# Patient Record
Sex: MALE | URBAN - METROPOLITAN AREA
[De-identification: names, ages, dates, MRNs, and addresses within clinical notes are randomized per-mention and may not be internally consistent; named-entity substitution may affect disease eponyms.]

---

## 2019-08-21 ENCOUNTER — INPATIENT (INPATIENT)
Facility: HOSPITAL | Age: 84
LOS: 0 days | Discharge: HOME | End: 2019-08-21
Attending: HOSPITALIST | Admitting: HOSPITALIST
Payer: MEDICARE

## 2019-08-21 ENCOUNTER — TRANSCRIPTION ENCOUNTER (OUTPATIENT)
Age: 84
End: 2019-08-21

## 2019-08-21 VITALS
OXYGEN SATURATION: 98 % | RESPIRATION RATE: 16 BRPM | HEART RATE: 96 BPM | SYSTOLIC BLOOD PRESSURE: 147 MMHG | DIASTOLIC BLOOD PRESSURE: 63 MMHG | TEMPERATURE: 98 F

## 2019-08-21 VITALS
HEART RATE: 78 BPM | OXYGEN SATURATION: 96 % | DIASTOLIC BLOOD PRESSURE: 54 MMHG | TEMPERATURE: 98 F | RESPIRATION RATE: 16 BRPM | SYSTOLIC BLOOD PRESSURE: 124 MMHG

## 2019-08-21 DIAGNOSIS — K52.9 NONINFECTIVE GASTROENTERITIS AND COLITIS, UNSPECIFIED: ICD-10-CM

## 2019-08-21 DIAGNOSIS — R79.89 OTHER SPECIFIED ABNORMAL FINDINGS OF BLOOD CHEMISTRY: ICD-10-CM

## 2019-08-21 DIAGNOSIS — I10 ESSENTIAL (PRIMARY) HYPERTENSION: ICD-10-CM

## 2019-08-21 DIAGNOSIS — Z29.9 ENCOUNTER FOR PROPHYLACTIC MEASURES, UNSPECIFIED: ICD-10-CM

## 2019-08-21 LAB
ALBUMIN SERPL ELPH-MCNC: 4.1 G/DL — SIGNIFICANT CHANGE UP (ref 3.3–5)
ALP SERPL-CCNC: 76 U/L — SIGNIFICANT CHANGE UP (ref 40–120)
ALT FLD-CCNC: 13 U/L — SIGNIFICANT CHANGE UP (ref 4–41)
ANION GAP SERPL CALC-SCNC: 16 MMO/L — HIGH (ref 7–14)
APPEARANCE UR: CLEAR — SIGNIFICANT CHANGE UP
AST SERPL-CCNC: 18 U/L — SIGNIFICANT CHANGE UP (ref 4–40)
BASE EXCESS BLDV CALC-SCNC: 0.6 MMOL/L — SIGNIFICANT CHANGE UP
BASE EXCESS BLDV CALC-SCNC: 0.8 MMOL/L — SIGNIFICANT CHANGE UP
BASOPHILS # BLD AUTO: 0.03 K/UL — SIGNIFICANT CHANGE UP (ref 0–0.2)
BASOPHILS NFR BLD AUTO: 0.2 % — SIGNIFICANT CHANGE UP (ref 0–2)
BILIRUB SERPL-MCNC: 0.2 MG/DL — SIGNIFICANT CHANGE UP (ref 0.2–1.2)
BILIRUB UR-MCNC: NEGATIVE — SIGNIFICANT CHANGE UP
BLOOD GAS VENOUS - CREATININE: 1.02 MG/DL — SIGNIFICANT CHANGE UP (ref 0.5–1.3)
BLOOD GAS VENOUS - CREATININE: < 0.36 MG/DL — LOW (ref 0.5–1.3)
BLOOD GAS VENOUS - FIO2: 21 — SIGNIFICANT CHANGE UP
BLOOD UR QL VISUAL: NEGATIVE — SIGNIFICANT CHANGE UP
BUN SERPL-MCNC: 21 MG/DL — SIGNIFICANT CHANGE UP (ref 7–23)
CALCIUM SERPL-MCNC: 9.5 MG/DL — SIGNIFICANT CHANGE UP (ref 8.4–10.5)
CHLORIDE BLDV-SCNC: 108 MMOL/L — SIGNIFICANT CHANGE UP (ref 96–108)
CHLORIDE BLDV-SCNC: 114 MMOL/L — HIGH (ref 96–108)
CHLORIDE SERPL-SCNC: 102 MMOL/L — SIGNIFICANT CHANGE UP (ref 98–107)
CO2 SERPL-SCNC: 24 MMOL/L — SIGNIFICANT CHANGE UP (ref 22–31)
COLOR SPEC: YELLOW — SIGNIFICANT CHANGE UP
CREAT SERPL-MCNC: 0.91 MG/DL — SIGNIFICANT CHANGE UP (ref 0.5–1.3)
EOSINOPHIL # BLD AUTO: 0.13 K/UL — SIGNIFICANT CHANGE UP (ref 0–0.5)
EOSINOPHIL NFR BLD AUTO: 1 % — SIGNIFICANT CHANGE UP (ref 0–6)
GAS PNL BLDV: 138 MMOL/L — SIGNIFICANT CHANGE UP (ref 136–146)
GAS PNL BLDV: 140 MMOL/L — SIGNIFICANT CHANGE UP (ref 136–146)
GLUCOSE BLDV-MCNC: 193 MG/DL — HIGH (ref 70–99)
GLUCOSE BLDV-MCNC: 234 MG/DL — HIGH (ref 70–99)
GLUCOSE SERPL-MCNC: 234 MG/DL — HIGH (ref 70–99)
GLUCOSE UR-MCNC: 50 — SIGNIFICANT CHANGE UP
HCO3 BLDV-SCNC: 22 MMOL/L — SIGNIFICANT CHANGE UP (ref 20–27)
HCO3 BLDV-SCNC: 24 MMOL/L — SIGNIFICANT CHANGE UP (ref 20–27)
HCT VFR BLD CALC: 38.8 % — LOW (ref 39–50)
HCT VFR BLDV CALC: 36.9 % — LOW (ref 39–51)
HCT VFR BLDV CALC: 38.9 % — LOW (ref 39–51)
HGB BLD-MCNC: 12 G/DL — LOW (ref 13–17)
HGB BLDV-MCNC: 12 G/DL — LOW (ref 13–17)
HGB BLDV-MCNC: 12.7 G/DL — LOW (ref 13–17)
IMM GRANULOCYTES NFR BLD AUTO: 0.3 % — SIGNIFICANT CHANGE UP (ref 0–1.5)
KETONES UR-MCNC: NEGATIVE — SIGNIFICANT CHANGE UP
LACTATE BLDV-MCNC: 3.6 MMOL/L — HIGH (ref 0.5–2)
LACTATE BLDV-MCNC: 4.1 MMOL/L — CRITICAL HIGH (ref 0.5–2)
LACTATE BLDV-MCNC: 4.8 MMOL/L — CRITICAL HIGH (ref 0.5–2)
LEUKOCYTE ESTERASE UR-ACNC: NEGATIVE — SIGNIFICANT CHANGE UP
LYMPHOCYTES # BLD AUTO: 17 % — SIGNIFICANT CHANGE UP (ref 13–44)
LYMPHOCYTES # BLD AUTO: 2.19 K/UL — SIGNIFICANT CHANGE UP (ref 1–3.3)
MCHC RBC-ENTMCNC: 24.5 PG — LOW (ref 27–34)
MCHC RBC-ENTMCNC: 30.9 % — LOW (ref 32–36)
MCV RBC AUTO: 79.2 FL — LOW (ref 80–100)
MONOCYTES # BLD AUTO: 0.33 K/UL — SIGNIFICANT CHANGE UP (ref 0–0.9)
MONOCYTES NFR BLD AUTO: 2.6 % — SIGNIFICANT CHANGE UP (ref 2–14)
NEUTROPHILS # BLD AUTO: 10.13 K/UL — HIGH (ref 1.8–7.4)
NEUTROPHILS NFR BLD AUTO: 78.9 % — HIGH (ref 43–77)
NITRITE UR-MCNC: NEGATIVE — SIGNIFICANT CHANGE UP
NRBC # FLD: 0 K/UL — SIGNIFICANT CHANGE UP (ref 0–0)
PCO2 BLDV: 47 MMHG — SIGNIFICANT CHANGE UP (ref 41–51)
PCO2 BLDV: 58 MMHG — HIGH (ref 41–51)
PH BLDV: 7.28 PH — LOW (ref 7.32–7.43)
PH BLDV: 7.36 PH — SIGNIFICANT CHANGE UP (ref 7.32–7.43)
PH UR: 7 — SIGNIFICANT CHANGE UP (ref 5–8)
PLATELET # BLD AUTO: 227 K/UL — SIGNIFICANT CHANGE UP (ref 150–400)
PMV BLD: 10.8 FL — SIGNIFICANT CHANGE UP (ref 7–13)
PO2 BLDV: 22 MMHG — LOW (ref 35–40)
PO2 BLDV: 34 MMHG — LOW (ref 35–40)
POTASSIUM BLDV-SCNC: 3.9 MMOL/L — SIGNIFICANT CHANGE UP (ref 3.4–4.5)
POTASSIUM BLDV-SCNC: 4.1 MMOL/L — SIGNIFICANT CHANGE UP (ref 3.4–4.5)
POTASSIUM SERPL-MCNC: 4.2 MMOL/L — SIGNIFICANT CHANGE UP (ref 3.5–5.3)
POTASSIUM SERPL-SCNC: 4.2 MMOL/L — SIGNIFICANT CHANGE UP (ref 3.5–5.3)
PROT SERPL-MCNC: 8.1 G/DL — SIGNIFICANT CHANGE UP (ref 6–8.3)
PROT UR-MCNC: NEGATIVE — SIGNIFICANT CHANGE UP
RBC # BLD: 4.9 M/UL — SIGNIFICANT CHANGE UP (ref 4.2–5.8)
RBC # FLD: 15.2 % — HIGH (ref 10.3–14.5)
SAO2 % BLDV: 26 % — LOW (ref 60–85)
SAO2 % BLDV: 59.3 % — LOW (ref 60–85)
SODIUM SERPL-SCNC: 142 MMOL/L — SIGNIFICANT CHANGE UP (ref 135–145)
SP GR SPEC: 1.03 — SIGNIFICANT CHANGE UP (ref 1–1.04)
UROBILINOGEN FLD QL: NORMAL — SIGNIFICANT CHANGE UP
WBC # BLD: 12.85 K/UL — HIGH (ref 3.8–10.5)
WBC # FLD AUTO: 12.85 K/UL — HIGH (ref 3.8–10.5)

## 2019-08-21 PROCEDURE — 74177 CT ABD & PELVIS W/CONTRAST: CPT | Mod: 26

## 2019-08-21 PROCEDURE — 99223 1ST HOSP IP/OBS HIGH 75: CPT | Mod: GC

## 2019-08-21 RX ORDER — AMLODIPINE BESYLATE 2.5 MG/1
10 TABLET ORAL DAILY
Refills: 0 | Status: DISCONTINUED | OUTPATIENT
Start: 2019-08-21 | End: 2019-08-21

## 2019-08-21 RX ORDER — METOPROLOL TARTRATE 50 MG
1 TABLET ORAL
Qty: 0 | Refills: 0 | DISCHARGE

## 2019-08-21 RX ORDER — AMLODIPINE BESYLATE 2.5 MG/1
1 TABLET ORAL
Qty: 0 | Refills: 0 | DISCHARGE

## 2019-08-21 RX ORDER — ASPIRIN/CALCIUM CARB/MAGNESIUM 324 MG
1 TABLET ORAL
Qty: 0 | Refills: 0 | DISCHARGE

## 2019-08-21 RX ORDER — SODIUM CHLORIDE 9 MG/ML
1000 INJECTION INTRAMUSCULAR; INTRAVENOUS; SUBCUTANEOUS ONCE
Refills: 0 | Status: COMPLETED | OUTPATIENT
Start: 2019-08-21 | End: 2019-08-21

## 2019-08-21 RX ORDER — ACETAMINOPHEN 500 MG
650 TABLET ORAL EVERY 6 HOURS
Refills: 0 | Status: DISCONTINUED | OUTPATIENT
Start: 2019-08-21 | End: 2019-08-21

## 2019-08-21 RX ORDER — ACETAMINOPHEN 500 MG
650 TABLET ORAL ONCE
Refills: 0 | Status: COMPLETED | OUTPATIENT
Start: 2019-08-21 | End: 2019-08-21

## 2019-08-21 RX ORDER — SODIUM CHLORIDE 9 MG/ML
1000 INJECTION INTRAMUSCULAR; INTRAVENOUS; SUBCUTANEOUS
Refills: 0 | Status: DISCONTINUED | OUTPATIENT
Start: 2019-08-21 | End: 2019-08-21

## 2019-08-21 RX ORDER — ENOXAPARIN SODIUM 100 MG/ML
40 INJECTION SUBCUTANEOUS DAILY
Refills: 0 | Status: DISCONTINUED | OUTPATIENT
Start: 2019-08-21 | End: 2019-08-21

## 2019-08-21 RX ORDER — ONDANSETRON 8 MG/1
4 TABLET, FILM COATED ORAL ONCE
Refills: 0 | Status: COMPLETED | OUTPATIENT
Start: 2019-08-21 | End: 2019-08-21

## 2019-08-21 RX ORDER — ASPIRIN/CALCIUM CARB/MAGNESIUM 324 MG
81 TABLET ORAL DAILY
Refills: 0 | Status: DISCONTINUED | OUTPATIENT
Start: 2019-08-21 | End: 2019-08-21

## 2019-08-21 RX ADMIN — Medication 650 MILLIGRAM(S): at 07:18

## 2019-08-21 RX ADMIN — SODIUM CHLORIDE 1000 MILLILITER(S): 9 INJECTION INTRAMUSCULAR; INTRAVENOUS; SUBCUTANEOUS at 11:41

## 2019-08-21 RX ADMIN — SODIUM CHLORIDE 1000 MILLILITER(S): 9 INJECTION INTRAMUSCULAR; INTRAVENOUS; SUBCUTANEOUS at 07:18

## 2019-08-21 RX ADMIN — ONDANSETRON 4 MILLIGRAM(S): 8 TABLET, FILM COATED ORAL at 07:18

## 2019-08-21 RX ADMIN — SODIUM CHLORIDE 75 MILLILITER(S): 9 INJECTION INTRAMUSCULAR; INTRAVENOUS; SUBCUTANEOUS at 12:50

## 2019-08-21 NOTE — H&P ADULT - ASSESSMENT
89y man with HTN and recent travel hx to [facility] presents with diarrhea, n/v and generalized abdominal pain, admit for proctocolitis likely viral etiology.

## 2019-08-21 NOTE — H&P ADULT - PROBLEM SELECTOR PLAN 4
DVT prophylaxis: SCD  Diet: Clear liquid diet, advance as tolerated DVT prophylaxis: SCD  Diet: Clear liquid diet, advance as tolerated  GOC: ongoing, family has not decided on code status DVT prophylaxis: Lovenox   Diet: Clear liquid diet, advance as tolerated  GOC: ongoing, family has not decided on code status

## 2019-08-21 NOTE — H&P ADULT - NSHPLABSRESULTS_GEN_ALL_CORE
CBC Full  -  ( 21 Aug 2019 07:11 )  WBC Count : 12.85 K/uL  Hemoglobin : 12.0 g/dL  Hematocrit : 38.8 %  Platelet Count - Automated : 227 K/uL  Mean Cell Volume : 79.2 fL  Mean Cell Hemoglobin : 24.5 pg  Mean Cell Hemoglobin Concentration : 30.9 %  Auto Neutrophil # : 10.13 K/uL  Auto Lymphocyte # : 2.19 K/uL  Auto Monocyte # : 0.33 K/uL  Auto Eosinophil # : 0.13 K/uL  Auto Basophil # : 0.03 K/uL  Auto Neutrophil % : 78.9 %  Auto Lymphocyte % : 17.0 %  Auto Monocyte % : 2.6 %  Auto Eosinophil % : 1.0 %  Auto Basophil % : 0.2 %    08-21    142  |  102  |  21  ----------------------------<  234<H>  4.2   |  24  |  0.91    Ca    9.5      21 Aug 2019 07:11    TPro  8.1  /  Alb  4.1  /  TBili  0.2  /  DBili  x   /  AST  18  /  ALT  13  /  AlkPhos  76  08-21    LIVER FUNCTIONS - ( 21 Aug 2019 07:11 )  Alb: 4.1 g/dL / Pro: 8.1 g/dL / ALK PHOS: 76 u/L / ALT: 13 u/L / AST: 18 u/L / GGT: x         4.1  22    < from: CT Abdomen and Pelvis w/ IV Cont (08.21.19 @ 09:08) >    LOWER CHEST: Bilateral lower lobe subsegmental atelectasis.    LIVER: Within normal limits.  BILE DUCTS: Normal caliber.  GALLBLADDER: Within normal limits.  SPLEEN: Within normal limits.  PANCREAS: Within normal limits.  ADRENALS: Within normal limits.  KIDNEYS/URETERS: Bilateral renal cysts.     BLADDER: Within normal limits.  REPRODUCTIVE ORGANS: Prostate within normal limits.    BOWEL: No bowel obstruction. There is rectal wall thickening extending to   the level of the descending colon. Appendix is normal  PERITONEUM: No ascites.  VESSELS: Calcific atherosclerotic changes.  RETROPERITONEUM/LYMPH NODES: No lymphadenopathy.    ABDOMINAL WALL: Fat-containing umbilical hernia.  BONES: Degenerative changes of the spine.    IMPRESSION:     Proctocolitis.    < end of copied text >

## 2019-08-21 NOTE — H&P ADULT - NSHPSOCIALHISTORY_GEN_ALL_CORE
previously worked as a  in [employer] [facility]   lives with family in [place], but goes back and forth between [employer] [facility] [employer] and here  Denies smoking hx, illicit drug use   social EtOH drinker - drank a "small amount of Dequan Balderas" this past weekend

## 2019-08-21 NOTE — H&P ADULT - NSHPPHYSICALEXAM_GEN_ALL_CORE
PHYSICAL EXAM:    Vital Signs Last 24 Hrs  T(C): 36.2 (21 Aug 2019 10:51), Max: 36.5 (21 Aug 2019 06:07)  T(F): 97.1 (21 Aug 2019 10:51), Max: 97.7 (21 Aug 2019 06:07)  HR: 101 (21 Aug 2019 10:51) (78 - 101)  BP: 145/77 (21 Aug 2019 10:51) (124/54 - 145/77)  BP(mean): --  RR: 18 (21 Aug 2019 10:51) (16 - 18)  SpO2: 100% (21 Aug 2019 10:51) (96% - 100%)    General: No acute distress.  HEENT: NCAT.  PERRL.  EOMI.  No scleral icterus or injection.  Moist MM.  No oropharyngeal exudates.    Neck: Supple.  Full ROM.  No JVD.  No thyromegaly. No lymphadenopathy.   Heart: RRR.  Normal S1 and S2.  No murmurs, rubs, or gallops.   Lungs: CTAB. No wheezes, crackles, or rhonchi.    Abdomen: BS+, soft, NT/ND.  No organomegaly.  Skin: Warm and dry.  No rashes.  Extremities: No edema, clubbing, or cyanosis.  2+ peripheral pulses b/l.  Musculoskeletal: No deformities.  No spinal or paraspinal tenderness.  Neuro: A&Ox3.  CN II-XII intact.  5/5 strength in UE and LE b/l.  Tactile sensation intact in UE and LE b/l.  Cerebellar function intact PHYSICAL EXAM:    Vital Signs Last 24 Hrs  T(C): 36.2 (21 Aug 2019 10:51), Max: 36.5 (21 Aug 2019 06:07)  T(F): 97.1 (21 Aug 2019 10:51), Max: 97.7 (21 Aug 2019 06:07)  HR: 101 (21 Aug 2019 10:51) (78 - 101)  BP: 145/77 (21 Aug 2019 10:51) (124/54 - 145/77)  BP(mean): --  RR: 18 (21 Aug 2019 10:51) (16 - 18)  SpO2: 100% (21 Aug 2019 10:51) (96% - 100%)    General: NAD, elderly man sitting up in bed   HEENT: NCAT.  PERRL.  + dry mucosa membrane    Neck: Supple.  Full ROM.  No JVD.  Heart: RRR.  Normal S1 and S2.  No murmurs, rubs, or gallops.   Lungs: CTAB. No wheezes, crackles, or rhonchi.    Abdomen: soft, NT/ND, hyperactive bowel sound   Skin: Warm and dry.  No rashes.  Extremities: No edema, clubbing, or cyanosis.  2+ peripheral pulses b/l.  Musculoskeletal: No deformities.  No spinal or paraspinal tenderness.  Neuro: A&Ox 1-2, exam was limited due to lack of participation. per family, can walk without assistance, no issue with strength PHYSICAL EXAM:    Vital Signs Last 24 Hrs  T(C): 36.2 (21 Aug 2019 10:51), Max: 36.5 (21 Aug 2019 06:07)  T(F): 97.1 (21 Aug 2019 10:51), Max: 97.7 (21 Aug 2019 06:07)  HR: 101 (21 Aug 2019 10:51) (78 - 101)  BP: 145/77 (21 Aug 2019 10:51) (124/54 - 145/77)  BP(mean): --  RR: 18 (21 Aug 2019 10:51) (16 - 18)  SpO2: 100% (21 Aug 2019 10:51) (96% - 100%)    General: NAD, elderly man sitting up in bed   HEENT: NCAT.  PERRL.  + dry mucosa membrane    Neck: Supple.  Full ROM.  No JVD.  Heart: RRR.  Normal S1 and S2. + systolic murmur  Lungs: CTAB. No wheezes, crackles, or rhonchi.    Abdomen: soft, NT/ND, hyperactive bowel sound   Skin: Warm and dry.  No rashes.  Extremities: No edema, clubbing, or cyanosis.  2+ peripheral pulses b/l.  Musculoskeletal: No deformities.  No spinal or paraspinal tenderness.  Neuro: A&Ox 1-2, exam was limited due to lack of participation. per family, can walk without assistance, no issue with strength

## 2019-08-21 NOTE — DISCHARGE NOTE PROVIDER - HOSPITAL COURSE
H and P:     89y man with HTN and recent travel hx to [facility] presents with diarrhea, n/v and generalized abdominal pain. Abd pain started since 3AM, he had 3 foul smelling loose BMs with specks of blood, also had 2 episodes of NBNB vomiting.  Pt has family in [facility] and in the [facility], and travels back and forth. He returned from [facility] 2 weeks ago, last time in [facility] was 2 years ago. Denies fever, chills, CP, SOB, palpitation, headache. No sick contact at home. Denies prior hx of seizure, CAD, DM2.         Hospital course: pt had a brief stay in the hospital for proctocolitis likely 2/2 viral etiology. Pt is afebrile, hemodynamically stable, did not meet sepsis criteria, and was monitored off abx. Lab was significant for elevated lactate on admission (4.8 ->4.1 ->3.5). Pt received 2L of IVF in the ED, and endorsed improvement in nausea and abdominal pain. Pt could ambulate without assistance, and could tolerate PO diet. He will be discharged to home with outpt followup to PCP. H and P:     89y man with HTN and recent travel hx to [facility] presents with diarrhea, n/v and generalized abdominal pain. Abd pain started since 3AM, he had 3 foul smelling loose BMs with specks of blood, also had 2 episodes of NBNB vomiting.  Pt has family in [facility] and in the [facility], and travels back and forth. He returned from [facility] 2 weeks ago, last time in [facility] was 2 years ago. Denies fever, chills, CP, SOB, palpitation, headache. No sick contact at home. Denies prior hx of seizure, CAD, DM2.         Hospital course: pt had a brief stay in the hospital for proctocolitis likely 2/2 viral etiology. Pt is afebrile, hemodynamically stable, did not meet sepsis criteria, and was monitored off abx. Lab was significant for elevated lactate on admission (4.8 ->4.1 ->3.5). CT A/P was negative for bowel obstruction and showed proctocolitis. Pt received 2L of IVF in the ED, and endorsed improvement in nausea and abdominal pain. Pt could ambulate without assistance, and could tolerate PO diet. He will be discharged to home with outpt followup to PCP.

## 2019-08-21 NOTE — H&P ADULT - NSHPREVIEWOFSYSTEMS_GEN_ALL_CORE
REVIEW OF SYSTEMS:    CONSTITUTIONAL: No weakness, fevers or chills  EYES/ENT: No visual changes;  No vertigo or throat pain   NECK: No pain or stiffness  ENDOCRINE: no cold/hold intolerance, no polydipsia, no polyuria   HEMATOLOGY: no bruising, no bleeding, no lymphadenopathy   RESPIRATORY: No cough, wheezing, hemoptysis; No shortness of breath  CARDIOVASCULAR: No chest pain or palpitations  GASTROINTESTINAL: No abdominal pain; nausea, vomiting;  diarrhea or constipation. No hemetemesis, melena or hematochezia.  GENITOURITARY: No dysuria, frequency or hematuria  NEUROLOGICAL: No numbness or weakness  MUSCULOSKELETAL: no back pain, no spine deformity, no joint pain or deformity, no muscle ache   SKIN: No itching, burning, rashes, or lesions REVIEW OF SYSTEMS:    CONSTITUTIONAL: No weakness, fevers or chills  EYES/ENT: No visual changes;  No vertigo or throat pain   NECK: No pain or stiffness  ENDOCRINE: no cold/hold intolerance, no polydipsia, no polyuria   HEMATOLOGY: no bruising, no bleeding, no lymphadenopathy   RESPIRATORY: No cough, wheezing, hemoptysis; No shortness of breath  CARDIOVASCULAR: No chest pain or palpitations  GASTROINTESTINAL: + abdominal pain; + nausea, vomiting;  + loose stool. No hemetemesis, melena or hematochezia.  GENITOURITARY: No dysuria, frequency or hematuria  NEUROLOGICAL: No numbness or weakness  MUSCULOSKELETAL: no back pain, no spine deformity, no joint pain or deformity, no muscle ache   SKIN: No itching, burning, rashes, or lesions

## 2019-08-21 NOTE — ED PROVIDER NOTE - OBJECTIVE STATEMENT
89 y/o M with h/o HTN BIB EMS for abd pain.  Pt reports mid abd pain since 330am associated with an episode of nbnb vomiting and nonbloody diarrhea.  No CP, SOB, LOC.  (+)generalized weakness.    EMS reports call was initially for seizure, but daughter at bedside denies any LOC or seizure like activity.  She does state that in ambulance bay he appeared to look up and she thought he was having a seizure.  No tongue biting or bladder/bowel incontinence. 89 y/o M with h/o HTN BIB EMS for abd pain.  Pt reports mid abd pain since 330am associated with an episode of nbnb vomiting and nonbloody diarrhea.  No CP, SOB, LOC.  (+)generalized weakness.  No recent hospitalizations or abx use.  Pt recently came from [facility] on 8/8/19.    EMS reports call was initially for seizure, but daughter at bedside denies any LOC or seizure like activity.  She does state that in ambulance bay he appeared to look up and she thought he was having a seizure.  No tongue biting or bladder/bowel incontinence.

## 2019-08-21 NOTE — H&P ADULT - PROBLEM SELECTOR PLAN 2
- elevated lactate 4.8 at admission, now 4.1   - pt is hemodynamically stable   - s/p 2L of IVF, c/w IVF@75ml/hr   - trend lactate q4 - elevated lactate 4.8 at admission -> 4.1-> 3.6   - pt is hemodynamically stable, unlikely 2/2 hypoperfusion  - s/p 2L of IVF, c/w IVF@75ml/hr   - trend lactate q4

## 2019-08-21 NOTE — ED ADULT TRIAGE NOTE - CHIEF COMPLAINT QUOTE
Pt arrives to ED via EMS c/o abd pain following eating soup and had N/V/D sttarting 2 hours ago.  Pt had multiple bowel movements on way to hospital.  Pt had brief seizure like shaking for less than 1 minute in triage as pt was being registered.  Pt daughter became tearful and began shaking pt.  Daughter was asked to stop shaking the pt and pt was awake and A&Ox3.  No hx of seizure.  Fs= 196.  911 call was complaint of seizure.  Pt was found on bed by EMS A&Ox3.

## 2019-08-21 NOTE — H&P ADULT - HISTORY OF PRESENT ILLNESS
89y man with HTN and recent travel hx to [facility] presents with watery diarrhea, n/v and abdominal pain. Abd pain started since this morning. No fever, chills, CP, SOB. History obtained from family at bedside, pt is A&Ox 1-2 (to self, and knows that he is here to see the doctors)       89y man with HTN and recent travel hx to [facility] presents with diarrhea, n/v and generalized abdominal pain. Abd pain started since 3AM, he had 3 foul smelly loose BMs with specks of blood.  Pt has family in [facility] and the [facility]", and travels back and forth. He returned from [facility] 2 weeks ago, last time in [place]"" was 2 years ago. Denies fever, chills, CP, SOB, palpitation,  headache. History obtained from family at bedside, pt is A&Ox 1-2 (to self, and knows that he is here to see the doctors, interacts well with family members)      89y man with HTN and recent travel hx to [facility] presents with diarrhea, n/v and generalized abdominal pain. Abd pain started since 3AM, he had 3 foul smelling loose BMs with specks of blood, also had 2 episodes of NBNB vomiting.  Pt has family in [facility] and in the [facility], and travels back and forth. He returned from [facility] 2 weeks ago, last time in [facility] was 2 years ago. Denies fever, chills, CP, SOB, palpitation, headache. No sick contact at home. Denies prior hx of seizure, CAD, DM2.     Daughter at bedside endorses that initially she was concerned for seizure, prior to EMS arrival, pt was shaking and his eyes rolled back for "a few seconds". Pt did not respond to daughter calling him. No tongue laceration, no fall

## 2019-08-21 NOTE — H&P ADULT - ATTENDING COMMENTS
Pt with diarrhea likely viral etiology, now improving. Pt ambulated independently in the ED ~ 50-60 feet without any lightheadedness or other symptoms. His exam does suggest severe aortic stenosis, and family state the pt had a recent outpatient TTE which he will f/u with his Cardiologist. Gave precautions on activity and suggested oral hydration. Time planning discharge 35 minutes.

## 2019-08-21 NOTE — ED PROVIDER NOTE - PROGRESS NOTE DETAILS
Sunday Elias PGY3: agree with Dr. Elias's note Myles: signout received from Dr Elias.  Pt is a 89 yo M PMH HTN, accompanied by his daughter, p/w abd pain and vomiting.  Pending labs and CT scan.  If eval unrevealing and pt feels better, anticipate discharge. MD CHO:  Received s/o from Dr. Elias.  Pt arrived with n/v/d, abd pain.  CT demonstrates colitis.  Will give iv abx.  Admit for continued hydration and abx therapy.

## 2019-08-21 NOTE — H&P ADULT - PROBLEM SELECTOR PLAN 1
- presents with bloody diarrhea, n/v and generalized abdominal pain   - CT A/P showed proctocolitis   - does not meet sepsis criteria, pt is afebrile, hemodynamically stable, given bloody diarrhea, will monitor off abx  - s/p 2L of fluids in the ED, will start on NS @75ml/hr  - f/u UA, UA   - GI PCR, stool Cx, and ova/parasite - presents with diarrhea, n/v and generalized abdominal pain   - CT A/P showed proctocolitis, neg bowel obstruction   - does not meet sepsis criteria, pt is afebrile, hemodynamically stable, given bloody diarrhea, will monitor off abx  - s/p 2L of fluids in the ED, will start on NS @75ml/hr  - f/u UA, UA   - GI PCR, stool Cx, and ova/parasite - presents with diarrhea, n/v and generalized abdominal pain   - CT A/P showed proctocolitis, neg bowel obstruction   - does not meet sepsis criteria, pt is afebrile, hemodynamically stable, will monitor off abx  - s/p 2L of fluids in the ED, will start on NS @75ml/hr  - f/u UA, UA   - GI PCR, stool Cx, and ova/parasite

## 2019-08-21 NOTE — DISCHARGE NOTE PROVIDER - NSDCCPCAREPLAN_GEN_ALL_CORE_FT
PRINCIPAL DISCHARGE DIAGNOSIS  Diagnosis: Abdominal pain, vomiting, and diarrhea  Assessment and Plan of Treatment: You came to the hospital with abdominal pain, vomiting and diarrhea, likely from a viral infection. You are given IV fluids, and your symptoms have improved.  You are no longer nauseous, and your pain improved. Please follow up with your primary care doctor within 1 week of discharge. If you develop fever with sudden abdominal pain, or had loss of consciousness, please seek medical attention immediately.      SECONDARY DISCHARGE DIAGNOSES  Diagnosis: Hypertension  Assessment and Plan of Treatment: Please continue to take your home blood pressure medication PRINCIPAL DISCHARGE DIAGNOSIS  Diagnosis: Abdominal pain, vomiting, and diarrhea  Assessment and Plan of Treatment: You came to the hospital with abdominal pain, vomiting and diarrhea, likely from a viral infection. You are given IV fluids, and your symptoms have improved.  You are no longer nauseous, and your pain improved. Please follow up with your primary care doctor within 1 week of discharge, and make sure you drink enough fluids.  If you develop fever with sudden abdominal pain, or had loss of consciousness, please seek medical attention immediately.      SECONDARY DISCHARGE DIAGNOSES  Diagnosis: Hypertension  Assessment and Plan of Treatment: Please continue to take your home blood pressure medication

## 2019-08-21 NOTE — DISCHARGE NOTE PROVIDER - PROVIDER TOKENS
FREE:[LAST:[[employer]],FIRST:[Fredy],PHONE:[(   )    -],FAX:[(   )    -],ADDRESS:[[address] [place] [address]]]

## 2019-08-21 NOTE — ED ADULT NURSE NOTE - OBJECTIVE STATEMENT
Pt received in room 4, accompanied by daughter who gives most of history, but pt is A&Ox3. Reports mild generalized abdominal pain since approx 330AM, with 1 episode of vomiting and multiple episodes of diarrhea. Daughter states pt is usually ambulatory w/o assistance and continent. Pt arrives to room completely covered in feces. Endorsing generalized weakness. Pt appears lethargic but is responsive to verbal stimuli. Daughter states in ambay pt appeared to be having "a seizure". Pt appears to be having tremors, is currently afebrile. Skin intact. Resp even and unlabored. Abd appears distended, soft and slightly tender to palpation. 20G IV placed to L AC. Labs drawn and sent. will continue to monitor.

## 2019-08-21 NOTE — DISCHARGE NOTE NURSING/CASE MANAGEMENT/SOCIAL WORK - NSDCDPATPORTLINK_GEN_ALL_CORE
You can access the [facility] Portal, offered by [facility], by registering with the following website: [email or web address]

## 2019-08-22 LAB
GI PCR PANEL, STOOL: SIGNIFICANT CHANGE UP
SPECIMEN SOURCE: SIGNIFICANT CHANGE UP
SPECIMEN SOURCE: SIGNIFICANT CHANGE UP

## 2019-08-23 LAB
BACTERIA STL CULT: SIGNIFICANT CHANGE UP
BACTERIA UR CULT: SIGNIFICANT CHANGE UP
SPECIMEN SOURCE: SIGNIFICANT CHANGE UP

## 2019-08-27 LAB
O+P SPEC CONC: SIGNIFICANT CHANGE UP
SPECIMEN SOURCE: SIGNIFICANT CHANGE UP
TRI STN SPEC: SIGNIFICANT CHANGE UP

## 2019-09-01 ENCOUNTER — OUTPATIENT (OUTPATIENT)
Dept: OUTPATIENT SERVICES | Facility: HOSPITAL | Age: 84
LOS: 1 days | End: 2019-09-01
Payer: MEDICARE

## 2019-09-01 PROCEDURE — G9001: CPT

## 2019-09-17 DIAGNOSIS — Z71.89 OTHER SPECIFIED COUNSELING: ICD-10-CM

## 2019-09-17 PROBLEM — I10 ESSENTIAL (PRIMARY) HYPERTENSION: Chronic | Status: ACTIVE | Noted: 2019-08-21

## 2023-09-06 NOTE — DISCHARGE NOTE PROVIDER - CARE PROVIDERS DIRECT ADDRESSES
Exercise for a Healthier Heart     Exercise with a friend. When activity is fun, you're more likely to stick with it.   You may wonder how you can improve the health of your heart. If you’re thinking about exercise, you’re on the right track. You don’t need to become an athlete. But you do need a certain amount of brisk exercise to help strengthen your heart. If you have been diagnosed with a heart condition, your healthcare provider may advise exercise to help stabilize your condition. To help make exercise a habit, choose safe, fun activities.   Before you start  Check with your healthcare provider before starting an exercise program. This is especially important if you have not been active for a while. It's also important if you have a long-term (chronic) health problem such as heart disease, diabetes, or obesity. Or if you are at high risk for having these problems.   Why exercise?  Exercising regularly offers many healthy rewards. It can help you do all of the following:  Improve your blood cholesterol level to help prevent further heart trouble  Lower your blood pressure to help prevent a stroke or heart attack  Control diabetes, or reduce your risk of getting this disease  Improve your heart and lung function  Reach and stay at a healthy weight  Make your muscles stronger so you can stay active  Prevent falls and fractures by slowing the loss of bone mass (osteoporosis)  Manage stress better  Reduce your blood pressure  Improve your sense of self and your body image  Exercise tips    Ease into your routine. Set small goals. Then build on them. If you are not sure what your activity level should be, talk with your healthcare provider first before starting an exercise routine.  Exercise on most days. Aim for a total of 150 minutes (2 hours and 30 minutes) or more of moderate-intensity aerobic activity each week. Or 75 minutes (1 hour and 15 minutes) or more of vigorous-intensity aerobic activity each week. Or  try for a combination of both. Moderate activity means that you breathe heavier and your heart rate increases but you can still talk. Think about doing 40 minutes of moderate exercise, 3 to 4 times a week. For best results, activity should last for about 40 minutes to lower blood pressure and cholesterol. It's OK to work up to the 40-minute period over time. Examples of moderate-intensity activity are walking 1 mile in 15 minutes. Or doing 30 to 45 minutes of yard work.  Step up your daily activity level.  Along with your exercise program, try being more active the whole day. Walk instead of drive. Or park further away so that you take more steps each day. Do more household tasks or yard work. You may not be able to meet the advised mount of physical activity. But doing some moderate- or vigorous-intensity aerobic activity can help reduce your risk for heart disease. Your healthcare provider can help you figure out what is best for you.  Choose 1 or more activities you enjoy.  Walking is one of the easiest things you can do. You can also try swimming, riding a bike, dancing, or taking an exercise class.    When to call your healthcare provider  Call your healthcare provider if you have any of these:   Chest pain or feel dizzy or lightheaded  Burning, tightness, pressure, or heaviness in your chest, neck, shoulders, back, or arms  Abnormal shortness of breath  More joint or muscle pain  A very fast or irregular heartbeat (palpitations)  [employer] last reviewed this educational content on 7/1/2019  © 6386-9682 [employer]. [address] [place] [address], [place] [address]. All rights reserved. This information is not intended as a substitute for professional medical care. Always follow your healthcare professional's instructions.         ,DirectAddress_Unknown

## 2023-10-16 NOTE — PATIENT PROFILE ADULT - BRADEN ACTIVITY
Patient with right shoulder pain chronic with acute exacerbation.  Patient never had any work-up done we will do x-ray ibuprofen home with orthopedics follow-up.
(3) walks occasionally

## 2024-06-04 NOTE — PATIENT PROFILE ADULT - HOME ACCESSIBILITY CONCERNS
[facility] Medicine  History & Physical    Patient Name: Maximino Marinelli  MRN: [id number]  Patient Class: OP- Observation  Admission Date: 6/3/2024  Attending Physician: Brodie Alex III, MD   Primary Care Provider: Minor Cota MD         Patient information was obtained from patient, past medical records, and ER records.     Subjective:     Principal Problem:Cellulitis of right lower extremity    Chief Complaint:   Chief Complaint   Patient presents with    Leg Swelling     Patient presents to ED from home with c/o pain, swelling, and drainage to right lower leg x6 months. Patient states he has not been seen by a doctor since the pandemic. Yellow drainage and swelling noted to leg. Patient reports self medicating with valium and other drugs not prescribed by a doctor. Patient also reports he has had and ankle fusion with hardware to the LLE x20 years ago.         HPI: Patient, Maximino Marinelli, is a 60 y.o. male who presents to the ED for evaluation of pain, swelling, weeping and purulent drainage from his right lower extremity that started 6 months ago. States that it is persistent, but has gotten worse past week. Says that he has not medical care since before the pandemic. Claims history of HTN and HLD, but denies other medical conditions. No history of diabetes. Patient says that he has been self medicating with valium and occasional cocaine use for pain control. Patient says that he had a right ankle fusion with hardware also reports he has had and ankle fusion with hardware to the right lower extremity proximally 20 years ago. Patient says it is difficult to ambulate. Denies fever, chills, chest pain, shortness of breath, nausea/vomiting, or any other complaints at this time. Symptoms have been largely contained to lower extremities.    In ED, Vitals stable- /71, , Temp 98.6F, SpO2 95% on RA. Labs showed Normal CBC and CMP. Glucose 136. CRP elevated 49.8. UA normal. UDS presumptive  positive for cocaine and benzodiazepines. Xrays of lower extremity negative for osteomyelitis, but showed foreign bodies. Pt admitted to [facility]  inpatient medicine service for antibiotics and specialist consultation by podiatry for wound care and possible intervention.    Past Medical History:   Diagnosis Date    Arthritis     Hypercholesteremia        Past Surgical History:   Procedure Laterality Date    ANKLE FUSION      carpel tunnel      HERNIA REPAIR      INGUINAL HERNIA REPAIR      KNEE ARTHROSCOPY Right     ORTHOPEDIC SURGERY         Review of patient's allergies indicates:  No Known Allergies    No current facility-administered medications on file prior to encounter.     Current Outpatient Medications on File Prior to Encounter   Medication Sig    atorvastatin (LIPITOR) 10 MG tablet Take 1 tablet (10 mg total) by mouth once daily. (Patient not taking: Reported on 6/3/2024)    buPROPion (WELLBUTRIN XL) 150 MG TB24 tablet     gabapentin (NEURONTIN) 600 MG tablet Take 600 mg by mouth 2 (two) times daily.    [DISCONTINUED] aspirin 81 MG Chew Take 81 mg by mouth once daily.    [DISCONTINUED] carisoprodol (SOMA) 350 MG tablet Take 350 mg by mouth 4 (four) times daily as needed.    [DISCONTINUED] CHANTIX STARTING MONTH BOX 0.5 (11)-1 (42) mg tablet     [DISCONTINUED] esomeprazole (NEXIUM) 20 MG capsule Take 20 mg by mouth before breakfast.    [DISCONTINUED] HYDROcodone-acetaminophen (NORCO) 5-325 mg per tablet 1-2 tablets every 6 hours as needed for pain    [DISCONTINUED] ibuprofen (ADVIL,MOTRIN) 600 MG tablet Take 1 tablet (600 mg total) by mouth every 6 (six) hours as needed for Pain.    [DISCONTINUED] lidocaine (LIDODERM) 5 %(700 mg/patch) Place 1 patch onto the skin every 24 hours. Remove & Discard patch within 12 hours or as directed by MD    [DISCONTINUED] multivitamin (THERAGRAN) tablet Take 1 tablet by mouth once daily.    [DISCONTINUED] triamcinolone acetonide 0.1% (KENALOG) 0.1 % cream Apply topically 2  "(two) times daily.     Family History       Problem Relation (Age of Onset)    Diabetes Father          Tobacco Use    Smoking status: Former     Current packs/day: 0.00     Average packs/day: 0.5 packs/day for 20.0 years (10.0 ttl pk-yrs)     Types: Cigarettes     Start date: 6/16/1994     Quit date: 5/16/2014     Years since quitting: 10.0    Smokeless tobacco: Not on file   Substance and Sexual Activity    Alcohol use: No    Drug use: Yes     Types: "Crack" cocaine, Marijuana, Other-see comments    Sexual activity: Not Currently     Partners: Female     Review of Systems   Constitutional:  Positive for activity change. Negative for appetite change, chills, fatigue, fever and unexpected weight change.   HENT: Negative.     Respiratory: Negative.     Cardiovascular: Negative.    Gastrointestinal: Negative.    Genitourinary: Negative.    Musculoskeletal: Negative.    Skin:  Positive for color change, rash and wound.   Neurological: Negative.      Objective:     Vital Signs (Most Recent):  Temp: 98.2 °F (36.8 °C) (06/03/24 1739)  Pulse: 96 (06/03/24 1739)  Resp: 20 (06/03/24 1739)  BP: 131/65 (06/03/24 1739)  SpO2: 98 % (06/03/24 1739) Vital Signs (24h Range):  Temp:  [98.2 °F (36.8 °C)-98.6 °F (37 °C)] 98.2 °F (36.8 °C)  Pulse:  [] 96  Resp:  [18-20] 20  SpO2:  [95 %-100 %] 98 %  BP: (123-133)/(65-80) 131/65     Weight: 136 kg (299 lb 13.2 oz)  Body mass index is 45.59 kg/m².     Physical Exam  Vitals and nursing note reviewed.   Constitutional:       General: He is not in acute distress.     Appearance: He is obese. He is ill-appearing.      Interventions: He is sedated.      Comments: Patient appeared to be under the influence of a substance. States he takes valium and cocaine at home.   HENT:      Head: Normocephalic and atraumatic.   Cardiovascular:      Rate and Rhythm: Normal rate and regular rhythm.      Heart sounds: Normal heart sounds.   Pulmonary:      Effort: Pulmonary effort is normal. No " respiratory distress.      Breath sounds: Normal breath sounds. No wheezing or rhonchi.   Abdominal:      General: There is no distension.      Tenderness: There is no abdominal tenderness.   Musculoskeletal:         General: Swelling, deformity and signs of injury present.      Cervical back: Normal range of motion.   Skin:     General: Skin is warm and dry.      Findings: Erythema present.   Neurological:      Mental Status: He is oriented to person, place, and time and easily aroused.                Significant Labs: All pertinent labs within the past 24 hours have been reviewed.  Recent Lab Results         06/03/24  1612   06/03/24  1318        Benzodiazepines Presumptive Positive         Methadone metabolites Negative         Phencyclidine Negative         Albumin   3.4       ALP   78       ALT   10       Amphetamines, Urine Negative         Anion Gap   9       Appearance, UA Clear         AST   12       Barbituates, Urine Negative         Baso #   0.02       Basophil %   0.2       Bilirubin (UA) Negative         BILIRUBIN TOTAL   0.5  Comment: For infants and newborns, interpretation of results should be based  on gestational age, weight and in agreement with clinical  observations.    Premature Infant recommended reference ranges:  Up to 24 hours.............<8.0 mg/dL  Up to 48 hours............<12.0 mg/dL  3-5 days..................<15.0 mg/dL  6-29 days.................<15.0 mg/dL         BUN   10       Calcium   10.0       Chloride   102       CO2   29       Cocaine, Urine Presumptive Positive         Color, UA Yellow         Creatinine   1.0       Urine Creatinine 97.5         CRP   49.8       Differential Method   Automated       eGFR   >60       Eos #   0.1       Eos %   0.8       Glucose   136       Glucose, UA Negative         Gran # (ANC)   6.8       Gran %   76.8       Hematocrit   47.5       Hemoglobin   15.9       Immature Grans (Abs)   0.02  Comment: Mild elevation in immature granulocytes is  non specific and   can be seen in a variety of conditions including stress response,   acute inflammation, trauma and pregnancy. Correlation with other   laboratory and clinical findings is essential.         Immature Granulocytes   0.2       Ketones, UA Negative         Lactic Acid Level   1.3  Comment: Falsely low lactic acid results can be found in samples   containing >=13.0 mg/dL total bilirubin and/or >=3.5 mg/dL   direct bilirubin.         Leukocyte Esterase, UA Negative         Lymph #   1.2       Lymph %   13.7       MCH   29.4       MCHC   33.5       MCV   88       Mono #   0.7       Mono %   8.3       MPV   9.0       NITRITE UA Negative         nRBC   0       Blood, UA Negative         Opiates, Urine Negative         pH, UA 8.0         Platelet Count   287       Potassium   4.2       PROTEIN TOTAL   7.8       Protein, UA Negative  Comment: Recommend a 24 hour urine protein or a urine   protein/creatinine ratio if globulin induced proteinuria is  clinically suspected.           RBC   5.41       RDW   12.9       Sodium   140       Spec Grav UA 1.015         Specimen UA Urine, Clean Catch         Marijuana (THC) Metabolite Negative         Toxicology Information SEE COMMENT  Comment: This screen includes the following classes of drugs at the listed   cut-off:    Benzodiazepines 200 ng/ml  Methadone 300 ng/ml  Cocaine metabolite 300 ng/ml  Opiates 300 ng/ml  Barbiturates 200 ng/ml  Amphetamines 1000 ng/ml  Marijuana metabs (THC) 50 ng/ml  Phencyclidine (PCP) 25 ng/ml    This is a screening test. If results do not correlate with clinical   presentation, then a confirmatory send out test is advised.     This report is intended for use in clinical monitoring and management   of   patients. It is not intended for use in employment related drug   testing.           UROBILINOGEN UA Negative         WBC   8.79               Significant Imaging: I have reviewed all pertinent imaging results/findings within the past  "24 hours.  Assessment/Plan:     * Cellulitis of right lower extremity  Pt appears to have chronic, lipodermatosclerotic changes of bilateral lower extremities, greater on the R leg. R leg wound also has weeping and purulent drainage on the anterior aspect. Likely PAD/PVD/Venous status ulcers contributing to initial skin changes. Likely complicated recently by infection.    History of surgery and surgical hardware placement.    Xray knee/foot 6/3: "There is end-stage degenerative change at the medial knee compartment. There is postoperative changes of tibial talar calcaneal fusion, the hardware is intact. The tibia and fibula demonstrate no acute fracture seen, no osseous lesion seen, no abnormal periosteal reaction.  No osseous erosions. The osseous structures of the foot demonstrate normal alignment.  No fracture or osseous erosion of the osseous structures of the foot. Tiny 5 x 1 mm high density foreign body within the subcutaneous soft tissues of the plantar aspect of the foot just plantar to the middle phalanx of the 3rd toe. Also, 5 x 1 mm radiopaque tiny foreign body within the subcutaneous soft tissues of the lateral plantar aspect of the foot just plantar and lateral to the 5th metatarsal bone. Large osteophyte protruding dorsally at the talar-tarsal joint and tarsal metatarsal joint. Thickened and irregular skin as well as significant circumferential subcutaneous soft tissue edema of the distal right lower extremity.  No soft tissue air.    - Podiatry consulted. Appreciate recs  - Needs wrapping/wound care  - Antibiotics started. Vanc for empiric MRSA coverage. Included Bactrim. Deescalate to PO as appropriate prior to discharge  - Blood and wound cultures taken, cultures pending.  - DVT US negative      Lipodermatosclerosis of both lower extremities  See Cellulitis of RLE      Substance abuse  Positive UDS for benzos and cocaine. Pt stated he self-medicates with Valium and Cocaine.     Monitor for " toxidrome symptoms and withdrawal symptoms    [place] protocols  Seizure precautions.        VTE Risk Mitigation (From admission, onward)           Ordered     heparin (porcine) injection 7,500 Units  Every 8 hours         06/03/24 1815     IP VTE HIGH RISK PATIENT  Once         06/03/24 1502     Place sequential compression device  Until discontinued         06/03/24 1502                           On 06/03/2024, patient should be placed in hospital observation services under my care in collaboration with Dr. Alex.    Pharmacokinetic Initial Assessment: IV Vancomycin    Assessment/Plan:    Initiate intravenous vancomycin with loading dose of 2500 mg once followed by a maintenance dose of vancomycin 2000 mg IV every 12 hours  Desired empiric serum trough concentration is 10 to 15 mcg/mL  Draw vancomycin trough level 60 min prior to fourth dose on 06/05/2024 at approximately 0600  Pharmacy will continue to follow and monitor vancomycin.      Please contact pharmacy at extension [phone] with any questions regarding this assessment.     Thank you for the consult,   Cleo Givens       Patient brief summary:  Maximino Marinelli is a 60 y.o. male initiated on antimicrobial therapy with IV Vancomycin for treatment of suspected skin & soft tissue infection    Drug Allergies:   Review of patient's allergies indicates:  No Known Allergies    Actual Body Weight:   136 kg    Renal Function:   Estimated Creatinine Clearance: 106 mL/min (based on SCr of 1 mg/dL).,     Dialysis Method (if applicable):  N/A    CBC (last 72 hours):  Recent Labs   Lab Result Units 06/03/24  1318   WBC K/uL 8.79   Hemoglobin g/dL 15.9   Hematocrit % 47.5   Platelets K/uL 287   Gran % % 76.8*   Lymph % % 13.7*   Mono % % 8.3   Eosinophil % % 0.8   Basophil % % 0.2   Differential Method  Automated       Metabolic Panel (last 72 hours):  Recent Labs   Lab Result Units 06/03/24  1318 06/03/24  1612   Sodium mmol/L 140  --    Potassium mmol/L 4.2  --   "  Chloride mmol/L 102  --    CO2 mmol/L 29  --    Glucose mg/dL 136*  --    Glucose, UA   --  Negative   BUN mg/dL 10  --    Creatinine mg/dL 1.0  --    Creatinine, Urine mg/dL  --  97.5   Albumin g/dL 3.4*  --    Total Bilirubin mg/dL 0.5  --    Alkaline Phosphatase U/L 78  --    AST U/L 12  --    ALT U/L 10  --        Drug levels (last 3 results):  No results for input(s): "VANCOMYCINRA", "VANCORANDOM", "VANCOMYCINPE", "VANCOPEAK", "VANCOMYCINTR", "VANCOTROUGH" in the last 72 hours.    Microbiologic Results:  Microbiology Results (last 7 days)       Procedure Component Value Units Date/Time    Blood culture (site 2) [[id number]] Collected: 06/03/24 1526    Order Status: Sent Specimen: Blood from Peripheral, Upper Arm, Right     Blood culture (site 1) [[id number]] Collected: 06/03/24 1526    Order Status: Sent Specimen: Blood from Peripheral, Upper Arm, Right     Blood Culture #2 **CANNOT BE ORDERED STAT** [[id number]] Collected: 06/03/24 1347    Order Status: Sent Specimen: Blood from Peripheral, Antecubital, Right     Aerobic culture (Specify Source) **CANNOT BE ORDERED AS STAT** [[id number]] Collected: 06/03/24 1323    Order Status: Sent Specimen: Wound from Leg, Right Updated: 06/03/24 1328    Blood Culture #1 **CANNOT BE ORDERED STAT** [[id number]] Collected: 06/03/24 1319    Order Status: Sent Specimen: Blood from Peripheral, Antecubital, Left               Lavon Payne MD  Department of Hospital Medicine  [facility]          " none
